# Patient Record
Sex: MALE | Race: WHITE | NOT HISPANIC OR LATINO | Employment: FULL TIME | URBAN - METROPOLITAN AREA
[De-identification: names, ages, dates, MRNs, and addresses within clinical notes are randomized per-mention and may not be internally consistent; named-entity substitution may affect disease eponyms.]

---

## 2018-05-10 ENCOUNTER — APPOINTMENT (OUTPATIENT)
Dept: URGENT CARE | Facility: CLINIC | Age: 38
End: 2018-05-10
Payer: OTHER MISCELLANEOUS

## 2018-05-10 ENCOUNTER — APPOINTMENT (OUTPATIENT)
Dept: RADIOLOGY | Facility: CLINIC | Age: 38
End: 2018-05-10
Payer: OTHER MISCELLANEOUS

## 2018-05-10 DIAGNOSIS — R22.32 LOCALIZED SWELLING ON LEFT HAND: Primary | ICD-10-CM

## 2018-05-10 DIAGNOSIS — R22.32 LOCALIZED SWELLING ON LEFT HAND: ICD-10-CM

## 2018-05-10 PROCEDURE — G0382 LEV 3 HOSP TYPE B ED VISIT: HCPCS | Performed by: FAMILY MEDICINE

## 2018-05-10 PROCEDURE — 99283 EMERGENCY DEPT VISIT LOW MDM: CPT | Performed by: FAMILY MEDICINE

## 2018-05-10 PROCEDURE — 73130 X-RAY EXAM OF HAND: CPT

## 2020-08-15 ENCOUNTER — OFFICE VISIT (OUTPATIENT)
Dept: URGENT CARE | Facility: CLINIC | Age: 40
End: 2020-08-15
Payer: COMMERCIAL

## 2020-08-15 VITALS
HEIGHT: 70 IN | WEIGHT: 180 LBS | SYSTOLIC BLOOD PRESSURE: 118 MMHG | OXYGEN SATURATION: 96 % | DIASTOLIC BLOOD PRESSURE: 76 MMHG | TEMPERATURE: 97.4 F | HEART RATE: 68 BPM | RESPIRATION RATE: 16 BRPM | BODY MASS INDEX: 25.77 KG/M2

## 2020-08-15 DIAGNOSIS — L55.9 SUNBURN: Primary | ICD-10-CM

## 2020-08-15 PROCEDURE — 99213 OFFICE O/P EST LOW 20 MIN: CPT | Performed by: EMERGENCY MEDICINE

## 2020-08-15 NOTE — PROGRESS NOTES
[id number]        NAME: Brittany Kirkland is a 38 y o  male  : 1980    MRN: [id number]  DATE: August 15, 2020  TIME: 11:33 AM    Assessment and Plan   Sunburn [L55 9]  1  Sunburn  silver sulfadiazine (SILVADENE,SSD) 1 % cream   I advised patient to stop the bacitracin as his persistent redness may be related to a sensitivity to that product  I will prescribe Silvadene and advised him to use that instead  I advised him that the key to getting this to heal is to eliminate the irritation from footwear as much as possible  Patient Instructions     Patient Instructions     Take an H1 antihistamine like Benadryl or non-sedating antihistamine like Zyrtec, Allegra or Claritin  Sunburn   WHAT YOU NEED TO KNOW:   A sunburn is when your skin is damaged by exposure to ultraviolet (UV) radiation  UV radiation comes from sunlight and devices such as tanning beds  DISCHARGE INSTRUCTIONS:   Return to the emergency department if:   · Your skin has many blisters, which break or bleed  · You feel dizzy, weak, or faint  · You have new headaches that do not go away with medicine  · You have problems thinking or remembering things  Contact your healthcare provider if:   · You have a fever  · Your skin is red and itchy from the sunscreen  · You have a new mole, or one that has changed color, shape, or size    · Your skin and mouth are dry, and you feel very thirsty  · You have questions or concerns about your condition or care  Medicines:   · Acetaminophen  is used to decrease pain  Too much acetaminophen can damage your liver  Read labels so that you know the active ingredients in each medicine that you take  Talk to your healthcare provider before you take more than one medicine that contains acetaminophen  Ask before you take over-the-counter medicine if you are also taking pain medicine ordered for you  · NSAIDs , such as ibuprofen, help decrease swelling, pain, and fever   This medicine is available with or without a doctor's order  NSAIDs can cause stomach bleeding or kidney problems in certain people  If you take blood thinner medicine, always ask your healthcare provider if NSAIDs are safe for you  Always read the medicine label and follow directions  · Steroids  decrease redness, pain, and swelling  This medicine may be given as a pill, or used as a lotion to rub on sunburned areas  · Take your medicine as directed  Contact your healthcare provider if you think your medicine is not helping or if you have side effects  Tell him or her if you are allergic to any medicine  Keep a list of the medicines, vitamins, and herbs you take  Include the amounts, and when and why you take them  Bring the list or the pill bottles to follow-up visits  Carry your medicine list with you in case of an emergency  Manage your symptoms:   · Apply a cool compress  A cool compress or wet towel can help soothe your skin  · Take short baths or showers  Bathe or shower in lukewarm water  Add oatmeal, baking soda, or cornstarch to the bath water to help reduce skin irritation  · Use lotions or gels to keep your skin moist   These include products such as aloe vera, petroleum jelly, or ointments  These may help cool your skin and decrease pain and redness  Ask which products would be best for you to use  · Drink liquids as directed  This will help prevent dehydration  Ask which liquids are best for you and how much liquid to drink each day  Prevent another sunburn:   · Wear sunscreen with an SPF of 15 or higher  Put sunscreen on 15 to 30 minutes before you go outside, and again every 2 hours  You will need to put sunscreen on again after you swim, sweat, or dry yourself with a towel  · Wear clothing that will block UV rays  This includes dark, loose clothing made of a tight weave fabric  Pants, long-sleeved shirts, wide-brimmed hats, and sunglasses also help block UV rays             · Stay indoors between 10 AM and 3 PM   This will help you avoid the highest concentrations of UV rays  · Limit exposure  Do not stay outdoors or in tanning beds for long periods  · Ask about vitamin supplements  Vitamins A, C, and E may help protect your skin against UV radiation  Follow up with your healthcare provider as directed:  Write down your questions so you remember to ask them during your visits  © 2017 [address] [employer] Information is for End User's use only and may not be sold, redistributed or otherwise used for commercial purposes  All illustrations and images included in CareNotes® are the copyrighted property of [employer]  or Nick Ding  The above information is an  only  It is not intended as medical advice for individual conditions or treatments  Talk to your doctor, nurse or pharmacist before following any medical regimen to see if it is safe and effective for you  Follow up with PCP in 3-5 days  Proceed to  ER if symptoms worsen  Chief Complaint     Chief Complaint   Patient presents with    Sunburn     c/o pain to both feet after getting sunburn 1 week ago, both feet with swelling, redness and opened areas, also with crusting  History of Present Illness       Patient complains of persistent pain and redness dorsal feet since sunburn 1 week ago  Patient has to wear boots at work which caused irritation to the burned areas of his feet  Review of Systems   Review of Systems   Constitutional: Negative for chills and fever  Musculoskeletal: Negative for arthralgias and joint swelling  Skin: Positive for wound  Negative for color change and rash  Neurological: Negative for numbness           Current Medications       Current Outpatient Medications:     silver sulfadiazine (SILVADENE,SSD) 1 % cream, Apply topically daily, Disp: 50 g, Rfl: 0    Current Allergies     Allergies as of 08/15/2020    (No Known Allergies) The following portions of the patient's history were reviewed and updated as appropriate: allergies, current medications, past family history, past medical history, past social history, past surgical history and problem list      Past Medical History:   Diagnosis Date    Known health problems: none        Past Surgical History:   Procedure Laterality Date    NO PAST SURGERIES         History reviewed  No pertinent family history  Medications have been verified  Objective   /76   Pulse 68   Temp (!) 97 4 °F (36 3 °C) (Tympanic)   Resp 16   Ht 5' 10" (1 778 m)   Wt 81 6 kg (180 lb)   SpO2 96%   BMI 25 83 kg/m²        Physical Exam     Physical Exam  Vitals signs and nursing note reviewed  Constitutional:       General: He is not in acute distress  Appearance: He is well-developed  Neck:      Musculoskeletal: Neck supple  Musculoskeletal:         General: No tenderness  Skin:     General: Skin is warm and dry  Findings: Erythema present  No rash  Comments: Erythema both feet dorsal aspects with overlying superficial abrasions bilaterally  No evidence of infection  Neurological:      Mental Status: He is alert and oriented to person, place, and time  Psychiatric:         Mood and Affect: Mood normal          Behavior: Behavior normal          Thought Content:  Thought content normal          Judgment: Judgment normal

## 2020-08-15 NOTE — PATIENT INSTRUCTIONS
Take an H1 antihistamine like Benadryl or non-sedating antihistamine like Zyrtec, Allegra or Claritin  Sunburn   WHAT YOU NEED TO KNOW:   A sunburn is when your skin is damaged by exposure to ultraviolet (UV) radiation  UV radiation comes from sunlight and devices such as tanning beds  DISCHARGE INSTRUCTIONS:   Return to the emergency department if:   · Your skin has many blisters, which break or bleed  · You feel dizzy, weak, or faint  · You have new headaches that do not go away with medicine  · You have problems thinking or remembering things  Contact your healthcare provider if:   · You have a fever  · Your skin is red and itchy from the sunscreen  · You have a new mole, or one that has changed color, shape, or size    · Your skin and mouth are dry, and you feel very thirsty  · You have questions or concerns about your condition or care  Medicines:   · Acetaminophen  is used to decrease pain  Too much acetaminophen can damage your liver  Read labels so that you know the active ingredients in each medicine that you take  Talk to your healthcare provider before you take more than one medicine that contains acetaminophen  Ask before you take over-the-counter medicine if you are also taking pain medicine ordered for you  · NSAIDs , such as ibuprofen, help decrease swelling, pain, and fever  This medicine is available with or without a doctor's order  NSAIDs can cause stomach bleeding or kidney problems in certain people  If you take blood thinner medicine, always ask your healthcare provider if NSAIDs are safe for you  Always read the medicine label and follow directions  · Steroids  decrease redness, pain, and swelling  This medicine may be given as a pill, or used as a lotion to rub on sunburned areas  · Take your medicine as directed  Contact your healthcare provider if you think your medicine is not helping or if you have side effects   Tell him or her if you are allergic to any medicine  Keep a list of the medicines, vitamins, and herbs you take  Include the amounts, and when and why you take them  Bring the list or the pill bottles to follow-up visits  Carry your medicine list with you in case of an emergency  Manage your symptoms:   · Apply a cool compress  A cool compress or wet towel can help soothe your skin  · Take short baths or showers  Bathe or shower in lukewarm water  Add oatmeal, baking soda, or cornstarch to the bath water to help reduce skin irritation  · Use lotions or gels to keep your skin moist   These include products such as aloe vera, petroleum jelly, or ointments  These may help cool your skin and decrease pain and redness  Ask which products would be best for you to use  · Drink liquids as directed  This will help prevent dehydration  Ask which liquids are best for you and how much liquid to drink each day  Prevent another sunburn:   · Wear sunscreen with an SPF of 15 or higher  Put sunscreen on 15 to 30 minutes before you go outside, and again every 2 hours  You will need to put sunscreen on again after you swim, sweat, or dry yourself with a towel  · Wear clothing that will block UV rays  This includes dark, loose clothing made of a tight weave fabric  Pants, long-sleeved shirts, wide-brimmed hats, and sunglasses also help block UV rays  · Stay indoors between 10 AM and 3 PM   This will help you avoid the highest concentrations of UV rays  · Limit exposure  Do not stay outdoors or in tanning beds for long periods  · Ask about vitamin supplements  Vitamins A, C, and E may help protect your skin against UV radiation  Follow up with your healthcare provider as directed:  Write down your questions so you remember to ask them during your visits  © 2017 [employer] Information is for End User's use only and may not be sold, redistributed or otherwise used for commercial purposes   All illustrations and images included in [employer] are the copyrighted property of [employer] , [employer]  or Nick Ding  The above information is an  only  It is not intended as medical advice for individual conditions or treatments  Talk to your doctor, nurse or pharmacist before following any medical regimen to see if it is safe and effective for you

## 2022-04-15 ENCOUNTER — OFFICE VISIT (OUTPATIENT)
Dept: FAMILY MEDICINE CLINIC | Facility: CLINIC | Age: 42
End: 2022-04-15
Payer: COMMERCIAL

## 2022-04-15 VITALS
HEIGHT: 70 IN | BODY MASS INDEX: 31.64 KG/M2 | DIASTOLIC BLOOD PRESSURE: 72 MMHG | WEIGHT: 221 LBS | SYSTOLIC BLOOD PRESSURE: 124 MMHG | HEART RATE: 77 BPM | OXYGEN SATURATION: 96 %

## 2022-04-15 DIAGNOSIS — J30.89 NON-SEASONAL ALLERGIC RHINITIS, UNSPECIFIED TRIGGER: Chronic | ICD-10-CM

## 2022-04-15 DIAGNOSIS — Z00.00 WELLNESS EXAMINATION: Primary | ICD-10-CM

## 2022-04-15 DIAGNOSIS — M79.671 PAIN OF RIGHT HEEL: ICD-10-CM

## 2022-04-15 PROCEDURE — 3008F BODY MASS INDEX DOCD: CPT | Performed by: FAMILY MEDICINE

## 2022-04-15 PROCEDURE — 99386 PREV VISIT NEW AGE 40-64: CPT | Performed by: FAMILY MEDICINE

## 2022-04-15 PROCEDURE — 1036F TOBACCO NON-USER: CPT | Performed by: FAMILY MEDICINE

## 2022-04-15 PROCEDURE — 3725F SCREEN DEPRESSION PERFORMED: CPT | Performed by: FAMILY MEDICINE

## 2022-04-15 NOTE — PROGRESS NOTES
ADULT ANNUAL [address] [place] [address] PRIMARY CARE    NAME: Gunjan Rogers  AGE: 36 y o  SEX: male  : 1980     DATE: 4/15/2022     Assessment and Plan:     Problem List Items Addressed This Visit        Respiratory    Non-seasonal allergic rhinitis (Chronic)     Discussed problem  May try adding saline nasal rinse at bedtime and in the morning and use p r n  Meds            Other    Pain of right heel     Discussed problem  I feel this probably represents sense mild Achilles tendinitis and if this reoccurs should do stretching of the calf muscles in the morning           Other Visit Diagnoses     Wellness examination    -  Primary    Relevant Orders    Renal function panel    Lipid panel          Immunizations and preventive care screenings were discussed with patient today  Appropriate education was printed on patient's after visit summary  Counseling:  · Dental Health: discussed importance of regular tooth brushing, flossing, and dental visits  BMI Counseling: Body mass index is 31 71 kg/m²  The BMI is above normal  Nutrition recommendations include moderation in carbohydrate intake  Exercise recommendations include moderate physical activity 150 minutes/week  No pharmacotherapy was ordered  Rationale for BMI follow-up plan is due to patient being overweight or obese  Depression Screening and Follow-up Plan: Patient was screened for depression during today's encounter  They screened negative with a PHQ-2 score of 0          Return in about 1 year (around 4/15/2023) for Annual physical      Chief Complaint:     Chief Complaint   Patient presents with    Establish Care    Heel Pain     periodically    Physical Exam      History of Present Illness:     Adult Annual Physical   Patient here for a comprehensive physical exam  The patient reports problems - Had been having right heel pain which was bothersome when sitting in bed if resting the heel and intermittently when walking although this is not evident today  Diet and Physical Activity  · Diet/Nutrition: well balanced diet  · Exercise: no formal exercise  Depression Screening  PHQ-2/9 Depression Screening    Little interest or pleasure in doing things: 0 - not at all  Feeling down, depressed, or hopeless: 0 - not at all  PHQ-2 Score: 0  PHQ-2 Interpretation: Negative depression screen       General Health  · Sleep: sleeps well  · Hearing: normal - bilateral   · Vision: no vision problems  · Dental: no dental visits for >1 year   Health  · Symptoms include: none     Review of Systems:     Review of Systems   Constitutional: Negative for activity change, appetite change, chills, fatigue, fever and unexpected weight change  HENT: Negative for congestion, dental problem, hearing loss, rhinorrhea and trouble swallowing  Eyes: Positive for itching ( in relationship to allergies)  Negative for visual disturbance  Respiratory: Negative for apnea, cough, chest tightness and shortness of breath  Cardiovascular: Negative for chest pain, palpitations and leg swelling  Gastrointestinal: Negative for abdominal distention, abdominal pain, constipation and diarrhea  Endocrine: Negative for polyuria ( no nocturia noted)  Genitourinary: Negative for difficulty urinating  Musculoskeletal: Positive for arthralgias (Intermittent right heel pain although does not have today  No injury noted)  Negative for myalgias  Skin: Negative for rash  Allergic/Immunologic: Positive for environmental allergies ( takes Zyrtec or Allegra for this)  Neurological: Negative for dizziness, weakness, light-headedness, numbness and headaches  Hematological: Negative for adenopathy  Psychiatric/Behavioral: Negative for agitation and sleep disturbance        Past Medical History:     Past Medical History:   Diagnosis Date    Known health problems: none       Past Surgical History:     Past Surgical History:   Procedure Laterality Date    NO PAST SURGERIES        Family History:     Family History   Problem Relation Age of Onset    No Known Problems Mother     No Known Problems Father     Hypertension Brother       Social History:     Social History     Socioeconomic History    Marital status: /Civil Union     Spouse name: None    Number of children: None    Years of education: None    Highest education level: None   Occupational History    None   Tobacco Use    Smoking status: Never Smoker    Smokeless tobacco: Never Used   Vaping Use    Vaping Use: Never used   Substance and Sexual Activity    Alcohol use: Yes     Alcohol/week: 21 0 standard drinks     Types: 21 Cans of beer per week    Drug use: Never    Sexual activity: None   Other Topics Concern    None   Social History Narrative    None     Social Determinants of Health     Financial Resource Strain: Not on file   Food Insecurity: Not on file   Transportation Needs: Not on file   Physical Activity: Not on file   Stress: Not on file   Social Connections: Not on file   Intimate Partner Violence: Not on file   Housing Stability: Not on file      Current Medications:     Current Outpatient Medications   Medication Sig Dispense Refill    silver sulfadiazine (SILVADENE,SSD) 1 % cream Apply topically daily (Patient not taking: Reported on 4/15/2022 ) 50 g 0     No current facility-administered medications for this visit  Allergies:     No Known Allergies   Physical Exam:     /72 (BP Location: Right arm, Patient Position: Sitting, Cuff Size: Standard)   Pulse 77   Ht 5' 10" (1 778 m)   Wt 100 kg (221 lb)   SpO2 96%   BMI 31 71 kg/m²     Physical Exam  Vitals and nursing note reviewed  Constitutional:       Appearance: Normal appearance  HENT:      Head: Normocephalic        Right Ear: Hearing, tympanic membrane, ear canal and external ear normal       Left Ear: Hearing, tympanic membrane, ear canal and external ear normal       Mouth/Throat:      Mouth: Mucous membranes are moist       Dentition: Normal dentition  Eyes:      Extraocular Movements: Extraocular movements intact  Conjunctiva/sclera: Conjunctivae normal       Pupils: Pupils are equal, round, and reactive to light  Neck:      Vascular: No carotid bruit  Cardiovascular:      Rate and Rhythm: Normal rate and regular rhythm  Pulses:           Dorsalis pedis pulses are 1+ on the right side and 1+ on the left side  Posterior tibial pulses are 2+ on the right side and 2+ on the left side  Heart sounds: Normal heart sounds  No murmur (Rate is 66) heard  Pulmonary:      Effort: Pulmonary effort is normal       Breath sounds: Normal breath sounds  Abdominal:      General: Abdomen is flat  There is no distension  Palpations: Abdomen is soft  There is no mass  Tenderness: There is no abdominal tenderness  Hernia: There is no hernia in the left inguinal area or right inguinal area  Genitourinary:     Penis: Normal and circumcised  Testes: Normal    Musculoskeletal:         General: No swelling  Cervical back: Normal range of motion and neck supple  No muscular tenderness  Right lower leg: No edema  Left lower leg: No edema  Right foot: No deformity  Left foot: No deformity  Feet:    Feet:      Right foot:      Protective Sensation: 8 sites tested  8 sites sensed  Skin integrity: Skin integrity normal       Left foot:      Protective Sensation: 8 sites tested  8 sites sensed  Skin integrity: Skin integrity normal    Lymphadenopathy:      Cervical: No cervical adenopathy  Skin:     General: Skin is warm and dry  Capillary Refill: Capillary refill takes 2 to 3 seconds  Findings: No rash  Neurological:      General: No focal deficit present  Mental Status: He is alert and oriented to person, place, and time        Cranial Nerves: No cranial nerve deficit  Sensory: No sensory deficit  Motor: No weakness        Coordination: Coordination normal       Gait: Gait normal       Deep Tendon Reflexes: Reflexes normal    Psychiatric:         Mood and Affect: Mood normal           Marichuy Campo MD  [place] [id number]

## 2022-04-15 NOTE — ASSESSMENT & PLAN NOTE
Discussed problem  May try adding saline nasal rinse at bedtime and in the morning and use p r n   Meds

## 2022-04-15 NOTE — ASSESSMENT & PLAN NOTE
Discussed problem    I feel this probably represents sense mild Achilles tendinitis and if this reoccurs should do stretching of the calf muscles in the morning

## 2022-04-15 NOTE — PATIENT INSTRUCTIONS
Discussed the problem with the right heel pain which I feel probably represented mild Achilles tendinitis which is not evident today  If return should do stretching of the calf muscles in the morning when 1st getting up  Will do renal and lipid panel for wellness check  Did recommend a COVID vaccine either Pfizer or William Cardona    Watch starch in the diet and get some walking activity in on the days off

## 2022-04-22 ENCOUNTER — LAB (OUTPATIENT)
Dept: LAB | Facility: HOSPITAL | Age: 42
End: 2022-04-22
Attending: FAMILY MEDICINE
Payer: COMMERCIAL

## 2022-04-22 DIAGNOSIS — Z00.00 WELLNESS EXAMINATION: ICD-10-CM

## 2022-04-22 LAB
ALBUMIN SERPL BCP-MCNC: 4 G/DL (ref 3.5–5)
ANION GAP SERPL CALCULATED.3IONS-SCNC: 9 MMOL/L (ref 4–13)
BUN SERPL-MCNC: 15 MG/DL (ref 5–25)
CALCIUM SERPL-MCNC: 8.5 MG/DL (ref 8.3–10.1)
CHLORIDE SERPL-SCNC: 102 MMOL/L (ref 100–108)
CHOLEST SERPL-MCNC: 238 MG/DL
CO2 SERPL-SCNC: 29 MMOL/L (ref 21–32)
CREAT SERPL-MCNC: 1.11 MG/DL (ref 0.6–1.3)
GFR SERPL CREATININE-BSD FRML MDRD: 80 ML/MIN/1.73SQ M
GLUCOSE P FAST SERPL-MCNC: 84 MG/DL (ref 65–99)
HDLC SERPL-MCNC: 57 MG/DL
LDLC SERPL CALC-MCNC: 161 MG/DL (ref 0–100)
NONHDLC SERPL-MCNC: 181 MG/DL
PHOSPHATE SERPL-MCNC: 3.1 MG/DL (ref 2.7–4.5)
POTASSIUM SERPL-SCNC: 4 MMOL/L (ref 3.5–5.3)
SODIUM SERPL-SCNC: 140 MMOL/L (ref 136–145)
TRIGL SERPL-MCNC: 98 MG/DL

## 2022-04-22 PROCEDURE — 80069 RENAL FUNCTION PANEL: CPT

## 2022-04-22 PROCEDURE — 36415 COLL VENOUS BLD VENIPUNCTURE: CPT

## 2022-04-22 PROCEDURE — 80061 LIPID PANEL: CPT

## 2022-04-22 NOTE — RESULT ENCOUNTER NOTE
Please call the patient regarding his abnormal result  Cholesterol levels are mildly elevated    Should use fiber supplement otherwise all labs are okay

## 2023-03-10 ENCOUNTER — TELEPHONE (OUTPATIENT)
Dept: FAMILY MEDICINE CLINIC | Facility: CLINIC | Age: 43
End: 2023-03-10

## 2023-03-10 NOTE — TELEPHONE ENCOUNTER
Spoke with patient and informed him that due to Dr Jad Marques no longer having Friday hours we need to reschedule his 4/21 appointment  He will call back to reschedule

## 2024-06-28 ENCOUNTER — OFFICE VISIT (OUTPATIENT)
Dept: ENDOCRINOLOGY | Facility: CLINIC | Age: 44
End: 2024-06-28
Payer: COMMERCIAL

## 2024-06-28 VITALS
HEIGHT: 70 IN | DIASTOLIC BLOOD PRESSURE: 90 MMHG | WEIGHT: 213.4 LBS | BODY MASS INDEX: 30.55 KG/M2 | OXYGEN SATURATION: 96 % | HEART RATE: 71 BPM | SYSTOLIC BLOOD PRESSURE: 130 MMHG

## 2024-06-28 DIAGNOSIS — E78.5 HYPERLIPIDEMIA, UNSPECIFIED HYPERLIPIDEMIA TYPE: ICD-10-CM

## 2024-06-28 DIAGNOSIS — G47.9 SLEEP DISTURBANCES: ICD-10-CM

## 2024-06-28 DIAGNOSIS — R79.89 LOW TESTOSTERONE: Primary | ICD-10-CM

## 2024-06-28 DIAGNOSIS — R53.83 OTHER FATIGUE: ICD-10-CM

## 2024-06-28 PROCEDURE — 99244 OFF/OP CNSLTJ NEW/EST MOD 40: CPT | Performed by: INTERNAL MEDICINE

## 2024-06-28 RX ORDER — ROSUVASTATIN CALCIUM 10 MG/1
10 TABLET, COATED ORAL DAILY
COMMUNITY
Start: 2024-06-12

## 2024-06-28 NOTE — PROGRESS NOTES
Elio Kendrick 44 y.o. male MRN: [id number]    Encounter: [id number]      Assessment & Plan     Problem List Items Addressed This Visit          Neurology/Sleep    Sleep disturbances     May benefit from evaluation for sleep apnea-he will discuss with his PCP            Other    Hyperlipidemia     Has been started on Crestor by PCP-         Relevant Medications    rosuvastatin (CRESTOR) 10 MG tablet    Low testosterone - Primary     Total testosterone was slightly low however free testosterone has not been checked.  For now repeat morning labs and advised to have it done between 8:53 in the morning and fasting.  Will check total and free testosterone as well as sex hormone binding globulin.  If testosterone is low he will need further workup and evaluation         Relevant Orders    Testosterone, free, total    Sex Hormone Binding Globulin    Comprehensive metabolic panel    Other fatigue    Relevant Orders    Comprehensive metabolic panel        CC: Low testosterone      History of Present Illness     HPI:    44-year-old male referred here for evaluation of low testosterone.  He states that he has been fatigued although cannot specify for how long  No sexual dusfunction     No history of inefrtiltyy    Sleeps 6-7 hours , some days not sleeping well, occasionally snoring , daytime sleepiness  No morning headches     Weight stable  No loss of muscle mass  No loss of body hair     Drinks 2-3 beers a day     History of concussion 20 years back   No  testicular trauma         Review of Systems    Historical Information   Past Medical History:   Diagnosis Date    Known health problems: none      Past Surgical History:   Procedure Laterality Date    LASIK      NO PAST SURGERIES       Social History   Social History     Substance and Sexual Activity   Alcohol Use Yes    Alcohol/week: 21.0 standard drinks of alcohol    Types: 21 Cans of beer per week    Comment: social     Social History     Substance and Sexual Activity  "  Drug Use Never     Social History     Tobacco Use   Smoking Status Never   Smokeless Tobacco Never     Family History:   Family History   Problem Relation Age of Onset    No Known Problems Mother     No Known Problems Father     Hypertension Brother        Meds/Allergies   Current Outpatient Medications   Medication Sig Dispense Refill    rosuvastatin (CRESTOR) 10 MG tablet Take 10 mg by mouth daily      silver sulfadiazine (SILVADENE,SSD) 1 % cream Apply topically daily (Patient not taking: Reported on 4/15/2022) 50 g 0     No current facility-administered medications for this visit.     No Known Allergies    Objective   Vitals: Blood pressure 130/90, pulse 71, height 5' 10\" (1.778 m), weight 96.8 kg (213 lb 6.4 oz), SpO2 96%.    Physical Exam  Vitals reviewed.   Constitutional:       General: He is not in acute distress.     Appearance: Normal appearance. He is obese. He is not ill-appearing, toxic-appearing or diaphoretic.   HENT:      Head: Normocephalic and atraumatic.   Eyes:      General: No scleral icterus.     Extraocular Movements: Extraocular movements intact.   Cardiovascular:      Rate and Rhythm: Normal rate and regular rhythm.      Heart sounds: Normal heart sounds. No murmur heard.  Pulmonary:      Effort: Pulmonary effort is normal. No respiratory distress.      Breath sounds: Normal breath sounds. No wheezing or rales.   Musculoskeletal:      Cervical back: Neck supple.      Right lower leg: No edema.      Left lower leg: No edema.   Lymphadenopathy:      Cervical: No cervical adenopathy.   Skin:     General: Skin is warm and dry.   Neurological:      General: No focal deficit present.      Mental Status: He is alert and oriented to person, place, and time.      Gait: Gait normal.   Psychiatric:         Mood and Affect: Mood normal.         Behavior: Behavior normal.         Thought Content: Thought content normal.         Judgment: Judgment normal.         The history was obtained from the " "review of the chart, patient.    Lab Results:   Lab Results   Component Value Date/Time    Potassium 4.5 06/07/2024 10:03 AM    Chloride 104 06/07/2024 10:03 AM    Carbon Dioxide 23 06/07/2024 10:03 AM    BUN 15 06/07/2024 10:03 AM    Creatinine 1.2 06/07/2024 10:03 AM    Calcium 9.2 06/07/2024 10:03 AM    EGFR 77 06/07/2024 10:03 AM             Imaging Studies:             Portions of the record may have been created with voice recognition software. Occasional wrong word or \"sound a like\" substitutions may have occurred due to the inherent limitations of voice recognition software. Read the chart carefully and recognize, using context, where substitutions have occurred.    "

## 2024-06-28 NOTE — ASSESSMENT & PLAN NOTE
Total testosterone was slightly low however free testosterone has not been checked.  For now repeat morning labs and advised to have it done between 8:53 in the morning and fasting.  Will check total and free testosterone as well as sex hormone binding globulin.  If testosterone is low he will need further workup and evaluation

## 2024-06-28 NOTE — LETTER
June 28, 2024     Shaggy Garcia, DO  [address] [facility] [address]    Patient: Elio Kendrick   YOB: 1980   Date of Visit: 6/28/2024       Dear Dr. Garcia:    Thank you for referring Elio Kendrick to me for evaluation. Below are my notes for this consultation.    If you have questions, please do not hesitate to call me. I look forward to following your patient along with you.         Sincerely,        Nicole De Luna MD        CC: No Recipients    Nicole De Luna MD  6/28/2024  1:21 PM  Sign when Signing Visit   Elio Kendrick 44 y.o. male MRN: [id number]    Encounter: [id number]      Assessment & Plan    Problem List Items Addressed This Visit          Neurology/Sleep    Sleep disturbances     May benefit from evaluation for sleep apnea-he will discuss with his PCP            Other    Hyperlipidemia     Has been started on Crestor by PCP-         Relevant Medications    rosuvastatin (CRESTOR) 10 MG tablet    Low testosterone - Primary     Total testosterone was slightly low however free testosterone has not been checked.  For now repeat morning labs and advised to have it done between 8:53 in the morning and fasting.  Will check total and free testosterone as well as sex hormone binding globulin.  If testosterone is low he will need further workup and evaluation         Relevant Orders    Testosterone, free, total    Sex Hormone Binding Globulin    Comprehensive metabolic panel    Other fatigue    Relevant Orders    Comprehensive metabolic panel        CC: Low testosterone      History of Present Illness    HPI:    44-year-old male referred here for evaluation of low testosterone.  He states that he has been fatigued although cannot specify for how long  No sexual dusfunction     No history of inefrtiltyy    Sleeps 6-7 hours , some days not sleeping well, occasionally snoring , daytime sleepiness  No morning headches     Weight stable  No loss of muscle mass  No loss of body hair  "    Drinks 2-3 beers a day     History of concussion 20 years back   No  testicular trauma         Review of Systems    Historical Information  Past Medical History:   Diagnosis Date   • Known health problems: none      Past Surgical History:   Procedure Laterality Date   • LASIK     • NO PAST SURGERIES       Social History  Social History     Substance and Sexual Activity   Alcohol Use Yes   • Alcohol/week: 21.0 standard drinks of alcohol   • Types: 21 Cans of beer per week    Comment: social     Social History     Substance and Sexual Activity   Drug Use Never     Social History     Tobacco Use   Smoking Status Never   Smokeless Tobacco Never     Family History:   Family History   Problem Relation Age of Onset   • No Known Problems Mother    • No Known Problems Father    • Hypertension Brother        Meds/Allergies  Current Outpatient Medications   Medication Sig Dispense Refill   • rosuvastatin (CRESTOR) 10 MG tablet Take 10 mg by mouth daily     • silver sulfadiazine (SILVADENE,SSD) 1 % cream Apply topically daily (Patient not taking: Reported on 4/15/2022) 50 g 0     No current facility-administered medications for this visit.     No Known Allergies    Objective  Vitals: Blood pressure 130/90, pulse 71, height 5' 10\" (1.778 m), weight 96.8 kg (213 lb 6.4 oz), SpO2 96%.    Physical Exam  Vitals reviewed.   Constitutional:       General: He is not in acute distress.     Appearance: Normal appearance. He is obese. He is not ill-appearing, toxic-appearing or diaphoretic.   HENT:      Head: Normocephalic and atraumatic.   Eyes:      General: No scleral icterus.     Extraocular Movements: Extraocular movements intact.   Cardiovascular:      Rate and Rhythm: Normal rate and regular rhythm.      Heart sounds: Normal heart sounds. No murmur heard.  Pulmonary:      Effort: Pulmonary effort is normal. No respiratory distress.      Breath sounds: Normal breath sounds. No wheezing or rales.   Musculoskeletal:      Cervical " "back: Neck supple.      Right lower leg: No edema.      Left lower leg: No edema.   Lymphadenopathy:      Cervical: No cervical adenopathy.   Skin:     General: Skin is warm and dry.   Neurological:      General: No focal deficit present.      Mental Status: He is alert and oriented to person, place, and time.      Gait: Gait normal.   Psychiatric:         Mood and Affect: Mood normal.         Behavior: Behavior normal.         Thought Content: Thought content normal.         Judgment: Judgment normal.         The history was obtained from the review of the chart, patient.    Lab Results:   Lab Results   Component Value Date/Time    Potassium 4.5 06/07/2024 10:03 AM    Chloride 104 06/07/2024 10:03 AM    Carbon Dioxide 23 06/07/2024 10:03 AM    BUN 15 06/07/2024 10:03 AM    Creatinine 1.2 06/07/2024 10:03 AM    Calcium 9.2 06/07/2024 10:03 AM    EGFR 77 06/07/2024 10:03 AM             Imaging Studies:             Portions of the record may have been created with voice recognition software. Occasional wrong word or \"sound a like\" substitutions may have occurred due to the inherent limitations of voice recognition software. Read the chart carefully and recognize, using context, where substitutions have occurred.    "

## 2024-07-29 ENCOUNTER — TELEPHONE (OUTPATIENT)
Dept: ENDOCRINOLOGY | Facility: CLINIC | Age: 44
End: 2024-07-29

## 2024-07-29 NOTE — TELEPHONE ENCOUNTER
I had ordered total and free testosterone however I have only received the results of total testosterone.  Please check with the lab to see if it was done and if so can I please have those results

## 2024-08-01 NOTE — TELEPHONE ENCOUNTER
I was able to call the lab and I spoke with one of the representative and she stated that the patient is going to have to come back in for a redraw of the free testosterone since they only hold the blood specimens for an additional 3 days after it is drawn.  I did call the patient to make him aware as well.  The lab representative stated that she placed a new order for him but I was unable to see that it was entered.  The patient also questioned if the testing would need to be done as an early morning specimen again.    Please review and advise.

## 2024-08-05 NOTE — TELEPHONE ENCOUNTER
I called and left the patient a detailed message concerning this and he is to call the office back if he has any additional questions concerning it.

## 2025-06-02 ENCOUNTER — OFFICE VISIT (OUTPATIENT)
Dept: URGENT CARE | Facility: CLINIC | Age: 45
End: 2025-06-02
Payer: COMMERCIAL

## 2025-06-02 VITALS
HEART RATE: 75 BPM | WEIGHT: 217.8 LBS | TEMPERATURE: 96.9 F | RESPIRATION RATE: 16 BRPM | DIASTOLIC BLOOD PRESSURE: 88 MMHG | SYSTOLIC BLOOD PRESSURE: 130 MMHG | OXYGEN SATURATION: 98 % | HEIGHT: 69 IN | BODY MASS INDEX: 32.26 KG/M2

## 2025-06-02 DIAGNOSIS — M76.61 ACHILLES TENDINITIS OF RIGHT LOWER EXTREMITY: Primary | ICD-10-CM

## 2025-06-02 PROCEDURE — G0382 LEV 3 HOSP TYPE B ED VISIT: HCPCS

## 2025-06-02 PROCEDURE — S9083 URGENT CARE CENTER GLOBAL: HCPCS

## 2025-06-02 RX ORDER — PREDNISONE 20 MG/1
40 TABLET ORAL DAILY
Qty: 10 TABLET | Refills: 0 | Status: SHIPPED | OUTPATIENT
Start: 2025-06-02 | End: 2025-06-07

## 2025-06-02 NOTE — PATIENT INSTRUCTIONS
Take course of steroids as prescribed. Be sure to take with food! Recommended to take in the morning, as taking this medication later in the day may cause sleep disturbance (keeping you awake). If diabetic, be sure to monitor your blood glucose levels while on this medication and notify PCP if levels become too elevated. Avoid taking ibuprofen containing products (ibuprofen, advil, aleve, motrin) while on steroid therapy!    Use tylenol for pain. Topical muscle creams/rubs such as biofreeze/icyhot may also be helpful.    RICE! = Rest, Ice, Compression (brace, ACE wrap), elevation.    Ice for 20 minutes at a time, 3-4 times per day for 3 days.    Insulate the skin from the ice to prevent frostbite.    Gentle stretching as tolerated.     Follow up with PCP in 3-5 days.  Proceed to  ER if symptoms worsen.    If tests have been performed at [facility], our office will contact you with results if changes need to be made to the care plan discussed with you at the visit.  You can review your full results on [facility] MyChart.

## 2025-06-02 NOTE — PROGRESS NOTES
[facility]        NAME: Elio Kendrick is a 45 y.o. male  : 1980    MRN: [id number]  DATE: 2025  TIME: 9:15 AM    Assessment and Plan   Achilles tendinitis of right lower extremity [M76.61]  1. Achilles tendinitis of right lower extremity  predniSONE 20 mg tablet        Plan to treat with course of oral steroid at this time for management of underlying inflammation. ACE wrap provided for compression and increased comfort. Gentle stretching as tolerated (exercises provided). Tylenol for pain/fever. Increased fluids & rest. May continue with other OTC and supportive therapies at home. Encouraged to follow up closely with family physician or healthcare provider. ED precautions provided/discussed. Patient in agreement with plan & verbalized understanding.       Patient Instructions   Take course of steroids as prescribed. Be sure to take with food! Recommended to take in the morning, as taking this medication later in the day may cause sleep disturbance (keeping you awake). If diabetic, be sure to monitor your blood glucose levels while on this medication and notify PCP if levels become too elevated. Avoid taking ibuprofen containing products (ibuprofen, advil, aleve, motrin) while on steroid therapy!    Use tylenol for pain. Topical muscle creams/rubs such as biofreeze/icyhot may also be helpful.    RICE! = Rest, Ice, Compression (brace, ACE wrap), elevation.    Ice for 20 minutes at a time, 3-4 times per day for 3 days.    Insulate the skin from the ice to prevent frostbite.    Gentle stretching as tolerated.     Follow up with PCP in 3-5 days.  Proceed to  ER if symptoms worsen.    If tests have been performed at [facility], our office will contact you with results if changes need to be made to the care plan discussed with you at the visit.  You [facility] on [facility].    Chief Complaint     Chief Complaint   Patient presents with    Foot Pain     Right foot pain  "that comes and goes for years, pain worsened yesterday. Denies any injuries.          History of Present Illness       Patient is a 45-year-old male who presents today for evaluation of right foot pain ongoing \"for years\".  He reports that this pain typically comes on a weekly basis and is intermittent in nature.  He reports his pain became suddenly worse yesterday prompting his visit today.  He denies experiencing any known traumas or injuries to the affected area.  He has never been evaluated for this in the past.  He reports feeling as though his pain is primarily located in the right Achilles and that he does have intermittent swelling however denies experiencing any erythema or bruising.  He denies ever having any wounds/abrasions to the affected area. He notes that ambulation & weight bearing causes his pain to increase.  His pain is currently rated as 6 out of 10 and sharp in nature.  He has tried taking Advil and Mobic without relief.               Review of Systems   Review of Systems   Constitutional:  Negative for activity change, appetite change, chills, diaphoresis, fatigue and fever.   Respiratory:  Negative for cough, chest tightness and shortness of breath.    Cardiovascular:  Negative for chest pain, palpitations and leg swelling.   Gastrointestinal:  Negative for abdominal pain, constipation, diarrhea, nausea and vomiting.   Musculoskeletal:  Positive for arthralgias (RIGHT Achilles), gait problem (d/t pain) and myalgias (RIGHT Achilles). Negative for back pain, joint swelling, neck pain and neck stiffness.   Skin:  Negative for color change, pallor, rash and wound.   Neurological:  Negative for dizziness, weakness, light-headedness, numbness and headaches.         Current Medications     Current Medications[1]    Current Allergies     Allergies as of 06/02/2025 - Reviewed 06/02/2025   Allergen Reaction Noted    Pollen extract Other (See Comments) 06/02/2025            The following portions of the " "patient's history were reviewed and updated as appropriate: allergies, current medications, past family history, past medical history, past social history, past surgical history and problem list.     Past Medical History[2]    Past Surgical History[3]    Family History[4]      Medications have been verified.        Objective   /88   Pulse 75   Temp (!) 96.9 °F (36.1 °C)   Resp 16   Ht 5' 9\" (1.753 m)   Wt 98.8 kg (217 lb 12.8 oz)   SpO2 98%   BMI 32.16 kg/m²   No LMP for male patient.       Physical Exam     Physical Exam  Vitals and nursing note reviewed.   Constitutional:       General: He is not in acute distress.     Appearance: Normal appearance. He is not ill-appearing, toxic-appearing or diaphoretic.   HENT:      Head: Normocephalic and atraumatic.      Right Ear: External ear normal.      Left Ear: External ear normal.      Nose: Nose normal.      Mouth/Throat:      Mouth: Mucous membranes are moist.      Pharynx: Oropharynx is clear.     Eyes:      Extraocular Movements: Extraocular movements intact.      Conjunctiva/sclera: Conjunctivae normal.      Pupils: Pupils are equal, round, and reactive to light.       Cardiovascular:      Rate and Rhythm: Normal rate and regular rhythm.      Pulses: Normal pulses.      Heart sounds: Normal heart sounds.   Pulmonary:      Effort: Pulmonary effort is normal. No respiratory distress.      Breath sounds: Normal breath sounds. No stridor. No wheezing, rhonchi or rales.   Chest:      Chest wall: No tenderness.     Musculoskeletal:         General: Tenderness present. No swelling, deformity or signs of injury. Normal range of motion.      Cervical back: Normal range of motion and neck supple.      Right lower leg: No tenderness. No edema.      Left lower leg: No tenderness. No edema.      Right ankle: No swelling, deformity, ecchymosis or lacerations. No tenderness. Normal range of motion.      Right Achilles Tendon: Tenderness present. No defects.      Left " ankle: Normal.      Right foot: Normal. Normal range of motion and normal capillary refill. No swelling, deformity, laceration, tenderness, bony tenderness or crepitus. Normal pulse.      Left foot: Normal.        Feet:       Comments: Inferior aspect of RIGHT Achilles TTP and without apparent deformity. There is no bruising, swelling, erythema, or abrasion/wound. Remainder of RIGHT foot, toes, and ankle nontender to palpation. LEFT foot, toes, ankle, and Achilles nonTTP.     Skin:     General: Skin is warm.      Capillary Refill: Capillary refill takes less than 2 seconds.      Coloration: Skin is not jaundiced or pale.      Findings: No bruising, erythema, lesion or rash.     Neurological:      General: No focal deficit present.      Mental Status: He is alert.      Sensory: No sensory deficit.      Motor: No weakness.      Gait: Gait normal.     Psychiatric:         Mood and Affect: Mood normal.         Behavior: Behavior normal.         Thought Content: Thought content normal.         Judgment: Judgment normal.                        [1]   Current Outpatient Medications:     predniSONE 20 mg tablet, Take 2 tablets (40 mg total) by mouth in the morning for 5 days., Disp: 10 tablet, Rfl: 0    rosuvastatin (CRESTOR) 10 MG tablet, Take 10 mg by mouth in the morning., Disp: , Rfl:     silver sulfadiazine (SILVADENE,SSD) 1 % cream, Apply topically daily (Patient not taking: Reported on 4/15/2022), Disp: 50 g, Rfl: 0  [2]   Past Medical History:  Diagnosis Date    Known health problems: none    [3]   Past Surgical History:  Procedure Laterality Date    LASIK     [4]   Family History  Problem Relation Name Age of Onset    No Known Problems Mother      No Known Problems Father      Hypertension Brother

## 2025-06-13 ENCOUNTER — APPOINTMENT (OUTPATIENT)
Dept: LAB | Facility: HOSPITAL | Age: 45
End: 2025-06-13
Payer: COMMERCIAL

## 2025-06-13 DIAGNOSIS — R79.89 LOW TESTOSTERONE: ICD-10-CM

## 2025-06-13 LAB
ALBUMIN SERPL BCG-MCNC: 4.2 G/DL (ref 3.5–5)
ALP SERPL-CCNC: 70 U/L (ref 34–104)
ALT SERPL W P-5'-P-CCNC: 36 U/L (ref 7–52)
ANION GAP SERPL CALCULATED.3IONS-SCNC: 7 MMOL/L (ref 4–13)
AST SERPL W P-5'-P-CCNC: 26 U/L (ref 13–39)
BILIRUB SERPL-MCNC: 0.8 MG/DL (ref 0.2–1)
BUN SERPL-MCNC: 17 MG/DL (ref 5–25)
CALCIUM SERPL-MCNC: 9 MG/DL (ref 8.4–10.2)
CHLORIDE SERPL-SCNC: 107 MMOL/L (ref 96–108)
CO2 SERPL-SCNC: 26 MMOL/L (ref 21–32)
CREAT SERPL-MCNC: 1.16 MG/DL (ref 0.6–1.3)
GFR SERPL CREATININE-BSD FRML MDRD: 75 ML/MIN/1.73SQ M
GLUCOSE P FAST SERPL-MCNC: 90 MG/DL (ref 65–99)
POTASSIUM SERPL-SCNC: 3.9 MMOL/L (ref 3.5–5.3)
PROT SERPL-MCNC: 7.1 G/DL (ref 6.4–8.4)
SODIUM SERPL-SCNC: 140 MMOL/L (ref 135–147)

## 2025-06-13 PROCEDURE — 84270 ASSAY OF SEX HORMONE GLOBUL: CPT

## 2025-06-13 PROCEDURE — 36415 COLL VENOUS BLD VENIPUNCTURE: CPT

## 2025-06-14 LAB — SHBG SERPL-SCNC: 13 NMOL/L (ref 16.5–55.9)

## 2025-06-16 LAB
TESTOST FREE SERPL-MCNC: 7.8 PG/ML (ref 6.8–21.5)
TESTOST SERPL-MCNC: 283 NG/DL (ref 264–916)

## 2025-06-17 ENCOUNTER — RESULTS FOLLOW-UP (OUTPATIENT)
Dept: ENDOCRINOLOGY | Facility: CLINIC | Age: 45
End: 2025-06-17

## 2025-07-03 ENCOUNTER — HOSPITAL ENCOUNTER (EMERGENCY)
Facility: HOSPITAL | Age: 45
Discharge: HOME | End: 2025-07-03
Attending: EMERGENCY MEDICINE
Payer: OTHER MISCELLANEOUS

## 2025-07-03 VITALS
RESPIRATION RATE: 16 BRPM | TEMPERATURE: 97.6 F | HEIGHT: 69 IN | SYSTOLIC BLOOD PRESSURE: 157 MMHG | HEART RATE: 65 BPM | WEIGHT: 217 LBS | OXYGEN SATURATION: 99 % | DIASTOLIC BLOOD PRESSURE: 88 MMHG | BODY MASS INDEX: 32.14 KG/M2

## 2025-07-03 DIAGNOSIS — S61.411A LACERATION OF RIGHT HAND WITHOUT FOREIGN BODY, INITIAL ENCOUNTER: Primary | ICD-10-CM

## 2025-07-03 PROCEDURE — 90715 TDAP VACCINE 7 YRS/> IM: CPT | Performed by: EMERGENCY MEDICINE

## 2025-07-03 PROCEDURE — 12001 RPR S/N/AX/GEN/TRNK 2.5CM/<: CPT | Performed by: EMERGENCY MEDICINE

## 2025-07-03 PROCEDURE — 99283 EMERGENCY DEPT VISIT LOW MDM: CPT

## 2025-07-03 PROCEDURE — 90471 IMMUNIZATION ADMIN: CPT

## 2025-07-03 PROCEDURE — 99284 EMERGENCY DEPT VISIT MOD MDM: CPT | Performed by: EMERGENCY MEDICINE

## 2025-07-03 RX ADMIN — TETANUS TOXOID, REDUCED DIPHTHERIA TOXOID AND ACELLULAR PERTUSSIS VACCINE, ADSORBED 0.5 ML: 5; 2.5; 8; 8; 2.5 SUSPENSION INTRAMUSCULAR at 14:00

## 2025-07-03 NOTE — ED PROVIDER NOTES
Time reflects when diagnosis was documented in both MDM as applicable and the Disposition within this note       Time User Action Codes Description Comment    7/3/2025  1:55 PM Herve Tovar Add [S61.411A] Laceration of right hand without foreign body, initial encounter           ED Disposition       ED Disposition   Discharge    Condition   Stable    Date/Time   Thu Jul 3, 2025  1:54 PM    Comment   Elio Kendrick discharge to home/self care.                   Assessment & Plan       Medical Decision Making  Patient seen and evaluated.  Patient at risk for bony injury, foreign body, wound dehiscence, bleeding.  Low suspicion for foreign body based on direct visualization.  Exam not concerning for fracture.  Laceration repaired with skin glue as described.  Patient tolerated well.  Tetanus updated.  Stable for discharge from the emergency department.  Primary care follow-up as needed.  Wound care instructions given.  Return precautions reviewed.  All questions answered.    Risk  Prescription drug management.             Medications   tetanus-diphtheria-acellular pertussis (BOOSTRIX) IM injection 0.5 mL (has no administration in time range)       ED Risk Strat Scores                    No data recorded        SBIRT 22yo+      Flowsheet Row Most Recent Value   Initial Alcohol Screen: US AUDIT-C     1. How often do you have a drink containing alcohol? 0 Filed at: 07/03/2025 1234   2. How many drinks containing alcohol do you have on a typical day you are drinking?  0 Filed at: 07/03/2025 1234   3a. Male UNDER 65: How often do you have five or more drinks on one occasion? 0 Filed at: 07/03/2025 1234   Audit-C Score 0 Filed at: 07/03/2025 1234   RUMA: How many times in the past year have you...    Used an illegal drug or used a prescription medication for non-medical reasons? Never Filed at: 07/03/2025 1234                            History of Present Illness       Chief Complaint   Patient presents with    Laceration      Right base of the hand       Past Medical History[1]   Past Surgical History[2]   Family History[3]   Social History[4]   E-Cigarette/Vaping    E-Cigarette Use Never User       E-Cigarette/Vaping Substances      I have reviewed and agree with the history as documented.     Patient presents to the emergency department for evaluation of laceration injury to the right hand.  Patient states that he was on a trailer, feeling a water tank when it ruptured or burst, causing him to fall off the trailer.  He fell on his outstretched right hand onto the blacktop.  He denies any other injury.  Denies hitting his head.  Able to move his hand and wrist without difficulty.  He has an open wound to the base of his palm on the ulnar aspect.  Bleeding is controlled.  Wound cleansed prior to ED arrival, also soaked prior to my evaluation.  Patient is ambidextrous.  No other complaints, modifying factors, or associated symptoms.        Review of Systems   All other systems reviewed and are negative.          Objective       ED Triage Vitals [07/03/25 1231]   Temperature Pulse Blood Pressure Respirations SpO2 Patient Position - Orthostatic VS   97.6 °F (36.4 °C) 65 157/88 16 99 % --      Temp Source Heart Rate Source BP Location FiO2 (%) Pain Score    Temporal Monitor Left arm -- No Pain      Vitals      Date and Time Temp Pulse SpO2 Resp BP Pain Score FACES Pain Rating User   07/03/25 1231 97.6 °F (36.4 °C) 65 99 % 16 157/88 No Pain -- KM            Physical Exam  Vitals and nursing note reviewed.   Constitutional:       General: He is not in acute distress.     Appearance: Normal appearance. He is well-developed. He is not ill-appearing.   HENT:      Head: Normocephalic and atraumatic.     Eyes:      Conjunctiva/sclera: Conjunctivae normal.       Cardiovascular:      Rate and Rhythm: Normal rate.   Pulmonary:      Effort: Pulmonary effort is normal. No respiratory distress.   Abdominal:      General: There is no distension.      Musculoskeletal:         General: Tenderness and signs of injury present. No swelling or deformity. Normal range of motion.      Cervical back: Normal range of motion and neck supple.      Comments: 1.5 cm avulsion/flap type laceration to the right palm at the base of the fifth metacarpal.  No bony tenderness palpation, swelling, or obvious deformity.  Range of motion intact.  Bleeding controlled.     Skin:     General: Skin is warm and dry.      Capillary Refill: Capillary refill takes less than 2 seconds.     Neurological:      General: No focal deficit present.      Mental Status: He is alert and oriented to person, place, and time.     Psychiatric:         Mood and Affect: Mood normal.         Behavior: Behavior normal.         Results Reviewed       None            No orders to display         Universal Protocol:  procedure performed by consultantConsent: Verbal consent obtained  Patient understanding: patient states understanding of the procedure being performed  Laceration repair    Date/Time: 7/3/2025 1:40 PM    Performed by: Herve Tovar MD  Authorized by: Herve Tovar MD  Body area: upper extremity  Location details: right hand  Laceration length: 1.5 (flap/partial avulsion) cm  Foreign bodies: no foreign bodies  Tendon involvement: none  Nerve involvement: none  Vascular damage: no    Wound Dehiscence:  Superficial Wound Dehiscence: simple closure      Procedure Details:  Amount of cleaning: standard  Debridement: none  Degree of undermining: none  Skin closure: glue  Approximation: close  Approximation difficulty: simple  Patient tolerance: patient tolerated the procedure well with no immediate complications          ED Medication and Procedure Management   Prior to Admission Medications   Prescriptions Last Dose Informant Patient Reported? Taking?   rosuvastatin (CRESTOR) 10 MG tablet  Self Yes No   Sig: Take 10 mg by mouth in the morning.      Facility-Administered Medications: None      Patient's Medications   Discharge Prescriptions    No medications on file     No discharge procedures on file.  ED SEPSIS DOCUMENTATION   Time reflects when diagnosis was documented in both MDM as applicable and the Disposition within this note       Time User Action Codes Description Comment    7/3/2025  1:55 PM Herve Tovar Add [S61.411A] Laceration of right hand without foreign body, initial encounter                      [1]   Past Medical History:  Diagnosis Date    Known health problems: none    [2]   Past Surgical History:  Procedure Laterality Date    LASIK     [3]   Family History  Problem Relation Name Age of Onset    No Known Problems Mother      No Known Problems Father      Hypertension Brother     [4]   Social History  Tobacco Use    Smoking status: Never    Smokeless tobacco: Never   Vaping Use    Vaping status: Never Used   Substance Use Topics    Alcohol use: Yes     Alcohol/week: 21.0 standard drinks of alcohol     Types: 21 Cans of beer per week     Comment: social    Drug use: Never        Herve Tovar MD  07/03/25 0278